# Patient Record
Sex: MALE | Race: WHITE | Employment: FULL TIME | URBAN - METROPOLITAN AREA
[De-identification: names, ages, dates, MRNs, and addresses within clinical notes are randomized per-mention and may not be internally consistent; named-entity substitution may affect disease eponyms.]

---

## 2018-06-21 PROBLEM — E78.00 PURE HYPERCHOLESTEROLEMIA: Status: ACTIVE | Noted: 2018-06-21

## 2018-06-21 PROBLEM — R60.0 LOCALIZED EDEMA: Status: ACTIVE | Noted: 2018-06-21

## 2018-06-21 PROBLEM — Z82.49 FAMILY HISTORY OF ISCHEMIC HEART DISEASE (IHD): Status: ACTIVE | Noted: 2018-06-21

## 2018-06-21 PROBLEM — I10 ESSENTIAL HYPERTENSION: Status: ACTIVE | Noted: 2018-06-21

## 2018-08-08 ENCOUNTER — HOSPITAL ENCOUNTER (OUTPATIENT)
Dept: CT IMAGING | Age: 48
Discharge: HOME | End: 2018-08-08
Attending: INTERNAL MEDICINE
Payer: SELF-PAY

## 2018-08-08 DIAGNOSIS — I10 ESSENTIAL HYPERTENSION: ICD-10-CM

## 2018-08-08 DIAGNOSIS — Z82.49 FAMILY HISTORY OF ISCHEMIC HEART DISEASE (IHD): ICD-10-CM

## 2018-08-08 PROCEDURE — 75571 CT HRT W/O DYE W/CA TEST: CPT

## 2018-08-08 NOTE — PROGRESS NOTES
Please call him, Ca score was low at 35, but still a little high for someone his age. Stay on ASA 81. Need to follow lipids. Work on diet and exercise. More of a plant based diet. Follow for angina, I will see in 6 mos as planned.   Thanks

## 2018-08-09 NOTE — PROGRESS NOTES
Patient informed of Ca score and Dr. Domingo Guthrie response. Patient verbalized understanding and appreciation.